# Patient Record
(demographics unavailable — no encounter records)

---

## 2018-08-14 NOTE — RAD
FRONTAL VIEW CHEST:

 

INDICATIONS:

Short of breath, progressive.

 

FINDINGS:

The cardiac silhouette is accentuated by the portable technique.  There is no lobar consolidation, ef
fusion, or discrete pneumothorax.

 

IMPRESSION:

No focal consolidation.

 

POS: [facility]

## 2019-03-06 NOTE — CT
HEAD CT WITHOUT CONTRAST

3/6/19

 

HISTORY: 

Dizziness. Headache.

 

COMPARISON:  

None.

 

FINDINGS:  

No parenchymal hemorrhage. No extra-axial hematoma. 

 

No midline shift. Basilar cisterns are patent. 

 

Brain volume is age appropriate. Cortical gray-white matter differentiation is preserved.

 

Ventricles and sulci are patent and symmetric. 

 

Adequate aeration of the sinuses and mastoid air cells. Calvarium is intact. 

 

IMPRESSION:  

No acute intracranial process. 

 

POS: [facility]